# Patient Record
Sex: MALE | URBAN - METROPOLITAN AREA
[De-identification: names, ages, dates, MRNs, and addresses within clinical notes are randomized per-mention and may not be internally consistent; named-entity substitution may affect disease eponyms.]

---

## 2021-01-16 ENCOUNTER — VIRTUAL VISIT (OUTPATIENT)
Dept: FAMILY MEDICINE | Facility: OTHER | Age: 61
End: 2021-01-16

## 2021-01-17 NOTE — PROGRESS NOTES
"Date: 2021 10:20:00  Clinician: Aubrie Alcala  Clinician NPI: [id number]  Patient: Rey Zamudio  Patient : 1960  Patient Address: [address] [place] [id number]  Patient Phone: [phone]  Visit Protocol: URI  Patient Summary:  Rey is a 60 year old ( : 1960 ) male who initiated a [facility] Visit for cold, sinus infection, or influenza. When asked the question \"Please sign me up to receive news, health information and promotions from [facility].\", Rey responded \"Yes\".    Rey states his symptoms started gradually 1 month or more ago. After his symptoms started, they improved and then got worse again.   His symptoms consist of myalgia, rhinitis, malaise, a cough, nausea, ear pain, and chills.   Symptom details     Nasal secretions: The color of his mucus is clear.    Cough: Rey coughs a few times an hour and his cough is not more bothersome at night. Phlegm does not come into his throat when he coughs. He believes his cough is caused by post-nasal drip.      Rey denies having sore throat, teeth pain, ageusia, diarrhea, wheezing, facial pain or pressure, fever, nasal congestion, anosmia, headache, and vomiting. He also denies having recent facial or sinus surgery in the past 60 days and taking antibiotic medication in the past month. He is not experiencing dyspnea.   Precipitating events  He has not recently been exposed to someone with influenza. Rey has not been in close contact with any high risk individuals.   Pertinent COVID-19 (Coronavirus) information  Rey does not work or volunteer as healthcare worker or a . In the past 14 days, Rey has not worked or volunteered at a healthcare facility or group living setting.   In the past 14 days, he also has not lived in a congregate living setting.   Rey has not had a close contact with a laboratory-confirmed COVID-19 patient within 14 days of symptom onset.    Rey has been tested for COVID-19.      Date(s) of his " COVID-19 test as reported by the patient (free text): tested twice once in the last couple months       Result of COVID-19 test as reported by the patient (free text): Negative       Type of test as reported by the patient (free text): boby Le has not received a COVID-19 vaccine.   Pertinent medical history  He has been told by his provider to avoid NSAIDs.   Rey does not have diabetes. He denies having immunosuppressive conditions (e.g., chemotherapy, HIV, organ transplant, long-term use of steroids or other immunosuppressive medications, splenectomy). He denies having congestive heart failure and severe COPD. He does not have asthma.   Rey does not need a return to work/school note.   Rey does not smoke or use smokeless tobacco.   Additional information as reported by the patient (free text): I have Constant draining in the back of my throat and ear ache at times. I have been using the Neti Pot. Would prefer a prescription online due to be high risk  from surgery 1 1/2 yrs ago for Histoplasmosis and don't want to go into a doctors office.   Weight: 197 lbs    MEDICATIONS: atorvastatin oral, benazepril-hydrochlorothiazide oral, metoprolol tartrate oral, ALLERGIES: NKDA  Clinician Response:  Dear Rey,  Based on the information provided, you have acute bacterial sinusitis, also known as a sinus infection. Sinus infections are caused by bacteria or a virus and symptoms are almost always identical. The difference between the 2 types of infections is timing.  Sinus infections start as viral infections and symptoms improve on their own in about 7 days. If symptoms have not improved after 7 days or have even worsened, a bacterial infection may have developed.  Medication information  I am prescribing:       Fluticasone 50 mcg/actuation nasal spray. Inhale 2 sprays in each nostril 1 time per day; after 1 week, may adjust to 1 - 2 sprays in each nostril 1 time per day. This medication takes several days to  start working, so keep taking it even if it doesn't help right away. There are no refills with this prescription.      Amoxicillin-pot clavulanate 875-125 mg oral tablet. Take 1 tablet by mouth every 12 hours for 7 days. There are no refills with this prescription.     Self care  Steps you can take to be as comfortable as possible:     Rest.    Drink plenty of fluids.    Take a warm shower to loosen congestion    Use a cool-mist humidifier.    Take a spoonful of honey to reduce your cough.     When to seek care  Please be seen in a clinic or urgent care if any of the following occur:     New symptoms develop, or symptoms become worse    Symptoms do not start to improve after 3 days of treatment     Call ahead before going to the clinic or urgent care.  It is possible to have an allergic reaction to an antibiotic even if you have not had one in the past. If you notice a new rash, significant swelling, or difficulty breathing, stop taking this medication immediately and go to a clinic or urgent care.  For the latest updates on COVID-19 (Coronavirus), please visit the Centers for Disease Control and Prevention (CDC).   Diagnosis: Acute bacterial sinusitis  Diagnosis ICD: J01.90  Prescription: fluticasone 50 mcg/actuation nasal spray,suspension 1 120 spray aerosol with adapter (grams), 30 days supply. Inhale 2 sprays in each nostril 1 time per day; after 1 week, may adjust to 1 - 2 sprays in each nostril 1 time per day.. Refills: 0, Refill as needed: no, Allow substitutions: yes  Prescription: amoxicillin-pot clavulanate 875-125 mg oral tablet 14 tablet, 7 days supply. Take 1 tablet by mouth every 12 hours for 7 days. Refills: 0, Refill as needed: no, Allow substitutions: yes  Pharmacy: [id number] - [phone] - [address] [facility] [address],  [place] [address]